# Patient Record
Sex: MALE | Race: WHITE | HISPANIC OR LATINO | Employment: FULL TIME | URBAN - METROPOLITAN AREA
[De-identification: names, ages, dates, MRNs, and addresses within clinical notes are randomized per-mention and may not be internally consistent; named-entity substitution may affect disease eponyms.]

---
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
## 2024-02-05 ENCOUNTER — DOCUMENTATION (OUTPATIENT)
Dept: BEHAVIORAL/MENTAL HEALTH CLINIC | Facility: CLINIC | Age: 19
End: 2024-02-05

## 2024-02-05 DIAGNOSIS — F43.29 ADJUSTMENT DISORDER WITH OTHER SYMPTOM: Primary | ICD-10-CM

## 2024-02-05 NOTE — PROGRESS NOTES
Psychotherapy Discharge Summary    Preferred Name: Malvin Okeefe  YOB: 2005    Admission date to psychotherapy: 06/08/2023    Referred by: Client referred self    Presenting Problem:  therapy related to anger issues     Course of treatment included : individual case management, psychoeducation, and individual therapy     Progress/Outcome of Treatment Goals (brief summary of course of treatment) No progress in treatment. Client attended a total of three sessions with the clinician.    Treatment Complications (if any): none    Treatment Progress: fair    Current [facility] Psychiatric Provider: None    Discharge Medications include: None    Discharge Date: 02/05/2024    Discharge Diagnosis:   1. Adjustment disorder with other symptom            Criteria for Discharge: two or more unexcused absences for services    Aftercare recommendations include (include specific referral names and phone numbers, if appropriate): None    Prognosis: fair

## 2024-02-26 ENCOUNTER — TELEPHONE (OUTPATIENT)
Dept: PSYCHIATRY | Facility: CLINIC | Age: 19
End: 2024-02-26

## 2024-03-08 ENCOUNTER — SOCIAL WORK (OUTPATIENT)
Dept: BEHAVIORAL/MENTAL HEALTH CLINIC | Facility: CLINIC | Age: 19
End: 2024-03-08
Payer: COMMERCIAL

## 2024-03-08 DIAGNOSIS — Z63.8: ICD-10-CM

## 2024-03-08 DIAGNOSIS — F43.29 ADJUSTMENT DISORDER WITH OTHER SYMPTOM: Primary | ICD-10-CM

## 2024-03-08 DIAGNOSIS — Z63.9 DYSFUNCTIONAL FAMILY PROCESSES: ICD-10-CM

## 2024-03-08 PROCEDURE — 90791 PSYCH DIAGNOSTIC EVALUATION: CPT

## 2024-03-08 SDOH — SOCIAL STABILITY - SOCIAL INSECURITY: PROBLEM RELATED TO PRIMARY SUPPORT GROUP, UNSPECIFIED: Z63.9

## 2024-03-08 SDOH — SOCIAL STABILITY - SOCIAL INSECURITY: OTHER SPECIFIED PROBLEMS RELATED TO PRIMARY SUPPORT GROUP: Z63.8

## 2024-03-08 NOTE — PSYCH
Behavioral Health Psychotherapy Assessment    Date of Initial Psychotherapy Assessment: 03/08/24  Referral Source: ***  Has a release of information been signed for the referral source? {Yes/No/NA:52836}    Preferred Name: Malvin Okeefe  Preferred Pronouns: {Preferred Pronouns:84176}  YOB: 2005 Age: 19 y.o.  Sex assigned at birth: {SL Sex Assigned at Birth:7907635163}   Gender Identity: ***  Race: {Race/ethnicity:35652}  Preferred Language: {Misc; languages:84468}    Emergency Contact:  Full Name: ***  Relationship to Client: ***  Contact information: ***    Primary Care Physician:  Mary Dressler-Carre, CRNP  [address] [place] [address]  [phone]  Has a release of information been signed? {Yes/No/NA:89904}    Physical Health History:  Past surgical procedures: ***  Do you have a history of any of the following: {SL Amb Psych Physical Health:00067}  Do you have any mobility issues? {YES-DESCRIBE/NO:43984}    Relevant Family History:  ***    Presenting Problem (What brings you in?)  ***    Mental Health Advance Directive:  Do you currently have a Mental Health Advance Directive?{YES/NO:20200}    Diagnosis:  No diagnosis found.    Initial Assessment:     Substance Abuse/Addiction Assessment:  Alcohol: Yes    Age of First Use:  18  Amount:  2-3 beers a couple times a month  Marijuana: Yes    Age of First Use:  16  Have you experienced blackouts as a result of substance use: Yes      Relationship History:    Relationship History:  In a long distance relationship with a female in [place]    Employment History    Are you currently employed: No      Currently seeking employment: No      Longest period of employment:  2 years     History:      Status: no history of  duty  Educational History:     Highest level of education:  Some college    School attended/attending:  [employer]    Have you ever had an IEP or 504-plan: No      Do you need  assistance with reading or writing: No          Visit start and stop times:    03/08/24  Start Time: 1002  Stop Time: 1052  Total Visit Time: 50 minutes

## 2024-03-11 NOTE — PSYCH
"Behavioral Health Psychotherapy Progress Note    Psychotherapy Provided: Individual Psychotherapy     No diagnosis found.    Goals addressed in session: Goal 1     DATA:   Met with client individually and reviewed intake completed in June 2023.  Client reported that conflcits within the family continue to be main issue as well as long distance relationship with female in [place].  Client reported wanting to work on coping skills for anger.   During this session, this clinician used the following therapeutic modalities: Engagement Strategies, Client-centered Therapy, and Supportive Psychotherapy    Substance Abuse was not addressed during this session. If the client is diagnosed with a co-occurring substance use disorder, please indicate any changes in the frequency or amount of use: . Stage of change for addressing substance use diagnoses: No substance use/Not applicable    ASSESSMENT:  Malvin Okeefe presents with a Anxious mood.     his affect is Blunted, which is congruent, with his mood and the content of the session. The client has not made progress on their goals.     Malvin Okeefe presents with a minimal risk of suicide, minimal risk of self-harm, and minimal risk of harm to others.    For any risk assessment that surpasses a \"low\" rating, a safety plan must be developed.    A safety plan was indicated: no  If yes, describe in detail crisis plan to be completed at next session    PLAN: Between sessions, Malvin Okeefe will try to identify any previous coping skills that helped with anger. At the next session, the therapist will use Engagement Strategies, Client-centered Therapy, and Supportive Psychotherapy to address anger management.    Behavioral Health Treatment Plan and Discharge Planning: Malvin Okeefe is aware of and agrees to continue to work on their treatment plan. They have identified and are working toward their discharge goals. yes    Visit start and stop " times:    03/11/24  Start Time: 1002  Stop Time: 1052  Total Visit Time: 50 minutes

## 2024-03-22 ENCOUNTER — SOCIAL WORK (OUTPATIENT)
Dept: BEHAVIORAL/MENTAL HEALTH CLINIC | Facility: CLINIC | Age: 19
End: 2024-03-22
Payer: COMMERCIAL

## 2024-03-22 DIAGNOSIS — Z63.9 DYSFUNCTIONAL FAMILY PROCESSES: ICD-10-CM

## 2024-03-22 DIAGNOSIS — F43.29 ADJUSTMENT DISORDER WITH OTHER SYMPTOM: Primary | ICD-10-CM

## 2024-03-22 PROCEDURE — 90837 PSYTX W PT 60 MINUTES: CPT

## 2024-03-22 SDOH — SOCIAL STABILITY - SOCIAL INSECURITY: PROBLEM RELATED TO PRIMARY SUPPORT GROUP, UNSPECIFIED: Z63.9

## 2024-03-22 NOTE — BH TREATMENT PLAN
Outpatient Behavioral Health Psychotherapy Treatment Plan    Malvin Okeefe  2005     Date of Initial Psychotherapy Assessment:    Date of Current Treatment Plan: 03/22/24  Treatment Plan Target Date: 9/15/24  Treatment Plan Expiration Date: 9/15/24    Diagnosis:   No diagnosis found.    Area(s) of Need: Anger management,identifying emotions    Long Term Goal 1 (in the client's own words): I want to learn coping skills for when my anger is out of control    Stage of Change: Preparation    Target Date for completion: 9/15/24     Anticipated therapeutic modalities: talk therapy, CBT, mindfulness     People identified to complete this goal: Malvin therapist      Objective 1: (identify the means of measuring success in meeting the objective):   Malvin hall participate in sessions using various modalities to identify triggers of anger as well as increasing his ability to identify other emotions      Objective 2: (identify the means of measuring success in meeting the objective):   Malvin will identify 2-3 coping skills to decrease his anger and will increase his ability to appropriately express her emotions         I am currently under the care of a [facility] psychiatric provider: no    My [facility] psychiatric provider is:     I am currently taking psychiatric medications: No    I feel that I will be ready for discharge from mental health care when I reach the following (measurable goal/objective): 75%    For children and adults who have a legal guardian:   Has there been any change to custody orders and/or guardianship status? NA. If yes, attach updated documentation.    I have created my Crisis Plan and have been offered a copy of this plan    Behavioral Health Treatment Plan [facility]: Diagnosis and Treatment Plan explained to Malvin Okeefe acknowledges an understanding of their diagnosis. Malvin Okeefe agrees to this treatment plan.    I have been offered a copy of this  Treatment Plan. yes
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
## 2024-04-05 ENCOUNTER — SOCIAL WORK (OUTPATIENT)
Dept: BEHAVIORAL/MENTAL HEALTH CLINIC | Facility: CLINIC | Age: 19
End: 2024-04-05
Payer: COMMERCIAL

## 2024-04-05 DIAGNOSIS — Z63.9 DYSFUNCTIONAL FAMILY PROCESSES: ICD-10-CM

## 2024-04-05 DIAGNOSIS — F43.29 ADJUSTMENT DISORDER WITH OTHER SYMPTOM: Primary | ICD-10-CM

## 2024-04-05 PROCEDURE — 90834 PSYTX W PT 45 MINUTES: CPT

## 2024-04-05 SDOH — SOCIAL STABILITY - SOCIAL INSECURITY: PROBLEM RELATED TO PRIMARY SUPPORT GROUP, UNSPECIFIED: Z63.9
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
## 2024-04-26 ENCOUNTER — SOCIAL WORK (OUTPATIENT)
Dept: BEHAVIORAL/MENTAL HEALTH CLINIC | Facility: CLINIC | Age: 19
End: 2024-04-26

## 2024-04-26 DIAGNOSIS — Z63.9 DYSFUNCTIONAL FAMILY PROCESSES: ICD-10-CM

## 2024-04-26 DIAGNOSIS — Z63.8: ICD-10-CM

## 2024-04-26 DIAGNOSIS — F43.29 ADJUSTMENT DISORDER WITH OTHER SYMPTOM: Primary | ICD-10-CM

## 2024-04-26 SDOH — SOCIAL STABILITY - SOCIAL INSECURITY: OTHER SPECIFIED PROBLEMS RELATED TO PRIMARY SUPPORT GROUP: Z63.8

## 2024-04-26 SDOH — SOCIAL STABILITY - SOCIAL INSECURITY: PROBLEM RELATED TO PRIMARY SUPPORT GROUP, UNSPECIFIED: Z63.9

## 2024-04-26 NOTE — PSYCH
No Call. No Show. No Charge    Malvin Okeefe no showed 04/26/24 appointment , staff sent email to reschedule    Treatment Plan not due at this session.

## 2024-05-10 ENCOUNTER — SOCIAL WORK (OUTPATIENT)
Dept: BEHAVIORAL/MENTAL HEALTH CLINIC | Facility: CLINIC | Age: 19
End: 2024-05-10
Payer: COMMERCIAL

## 2024-05-10 DIAGNOSIS — F43.29 ADJUSTMENT DISORDER WITH OTHER SYMPTOM: Primary | ICD-10-CM

## 2024-05-10 DIAGNOSIS — Z63.9 DYSFUNCTIONAL FAMILY PROCESSES: ICD-10-CM

## 2024-05-10 DIAGNOSIS — Z63.8: ICD-10-CM

## 2024-05-10 PROCEDURE — 90837 PSYTX W PT 60 MINUTES: CPT

## 2024-05-10 SDOH — SOCIAL STABILITY - SOCIAL INSECURITY: PROBLEM RELATED TO PRIMARY SUPPORT GROUP, UNSPECIFIED: Z63.9

## 2024-05-10 SDOH — SOCIAL STABILITY - SOCIAL INSECURITY: OTHER SPECIFIED PROBLEMS RELATED TO PRIMARY SUPPORT GROUP: Z63.8

## 2024-05-16 ENCOUNTER — TELEPHONE (OUTPATIENT)
Dept: PSYCHIATRY | Facility: CLINIC | Age: 19
End: 2024-05-16

## 2024-05-16 NOTE — TELEPHONE ENCOUNTER
Spoke to patient and/or parent/guardian to make aware of the Psych Encounter form needing to be signed from recent completed appointment(s).     Patient also stated he would create a MyChart to electronically sign the form.

## 2024-08-02 ENCOUNTER — SOCIAL WORK (OUTPATIENT)
Dept: BEHAVIORAL/MENTAL HEALTH CLINIC | Facility: CLINIC | Age: 19
End: 2024-08-02
Payer: COMMERCIAL

## 2024-08-02 DIAGNOSIS — F43.29 ADJUSTMENT DISORDER WITH OTHER SYMPTOM: Primary | ICD-10-CM

## 2024-08-02 DIAGNOSIS — Z63.9 DYSFUNCTIONAL FAMILY PROCESSES: ICD-10-CM

## 2024-08-02 DIAGNOSIS — Z63.8: ICD-10-CM

## 2024-08-02 PROCEDURE — 90834 PSYTX W PT 45 MINUTES: CPT

## 2024-08-02 SDOH — SOCIAL STABILITY - SOCIAL INSECURITY: PROBLEM RELATED TO PRIMARY SUPPORT GROUP, UNSPECIFIED: Z63.9

## 2024-08-02 SDOH — SOCIAL STABILITY - SOCIAL INSECURITY: OTHER SPECIFIED PROBLEMS RELATED TO PRIMARY SUPPORT GROUP: Z63.8

## 2024-08-02 NOTE — PSYCH
"Behavioral Health Psychotherapy Progress Note    Psychotherapy Provided: Individual Psychotherapy     No diagnosis found.    Goals addressed in session: Goal 1     DATA:  trip to [place] to see Magalie.  Angry=left for a break, she thought he left her at the hotel.  Talk about needing time out.  Magalies parents and area of [facility].  Father just built a house there, will be going back and forth.  Triggers vs slow boil.  Coping skills=breathing, self talk.  Reason=event/environment or genetics.  School plan for Associates in Business then BA at [employer] or [employer]=goal to open own company eventually.   During this session, this clinician used the following therapeutic modalities: Client-centered Therapy, Cognitive Behavioral Therapy, Motivational Interviewing, and Supportive Psychotherapy    Substance Abuse was not addressed during this session. If the client is diagnosed with a co-occurring substance use disorder, please indicate any changes in the frequency or amount of use: . Stage of change for addressing substance use diagnoses: No substance use/Not applicable    ASSESSMENT:  Malvin Okeefe presents with a Anxious mood.     his affect is Blunted, which is congruent, with his mood and the content of the session. The client has not made progress on their goals.     Malvin Okeefe presents with a minimal risk of suicide, minimal risk of self-harm, and minimal risk of harm to others.    For any risk assessment that surpasses a \"low\" rating, a safety plan must be developed.    A safety plan was indicated: no  If yes, describe in detail crisis plan on file    PLAN: Between sessions, Malvin Okeefe will use coping skill of take a \"time out\" when angry and will consider where anger is stemming from. At the next session, the therapist will use Client-centered Therapy, Cognitive Behavioral Therapy, and Supportive Psychotherapy to address anger management.    Behavioral Health Treatment Plan and " Discharge Planning: Malvin ALPA Audrey Okeefe is aware of and agrees to continue to work on their treatment plan. They have identified and are working toward their discharge goals. yes    Visit start and stop times:    08/02/24  Start Time: 1003  Stop Time: 1052  Total Visit Time: 49 minutes

## 2024-08-30 ENCOUNTER — SOCIAL WORK (OUTPATIENT)
Dept: BEHAVIORAL/MENTAL HEALTH CLINIC | Facility: CLINIC | Age: 19
End: 2024-08-30
Payer: COMMERCIAL

## 2024-08-30 DIAGNOSIS — F43.29 ADJUSTMENT DISORDER WITH OTHER SYMPTOM: Primary | ICD-10-CM

## 2024-08-30 DIAGNOSIS — Z63.8: ICD-10-CM

## 2024-08-30 DIAGNOSIS — Z63.9 DYSFUNCTIONAL FAMILY PROCESSES: ICD-10-CM

## 2024-08-30 PROCEDURE — 90837 PSYTX W PT 60 MINUTES: CPT

## 2024-08-30 SDOH — SOCIAL STABILITY - SOCIAL INSECURITY: OTHER SPECIFIED PROBLEMS RELATED TO PRIMARY SUPPORT GROUP: Z63.8

## 2024-08-30 SDOH — SOCIAL STABILITY - SOCIAL INSECURITY: PROBLEM RELATED TO PRIMARY SUPPORT GROUP, UNSPECIFIED: Z63.9

## 2024-08-30 NOTE — PSYCH
"Behavioral Health Psychotherapy Progress Note    Psychotherapy Provided: Individual Psychotherapy     No diagnosis found.    Goals addressed in session: Goal 1     DATA:   college courses-5.  GF argument, client \"flipped out\" on her, she has not returned calls or texts since Monday and turned off location.  Anxiety attacks-\"need to do something\", tingling in extremities, rapid heartbeat, sick feeling in stomach.  Ways to stop anxiety=keep busy (go to the beach with family this weekend), self talk, rubber band.  Client has been \"spamming\" her since Monday-she may need space.  Client agreed to contact 1-2 times a day max and leave a very short message \"I'm here, I want to work this out, call me when you are ready\".  Root of anger-possibly insecurity?    During this session, this clinician used the following therapeutic modalities: Client-centered Therapy, Cognitive Behavioral Therapy, Solution-Focused Therapy, and Supportive Psychotherapy    Substance Abuse was addressed during this session. If the client is diagnosed with a co-occurring substance use disorder, please indicate any changes in the frequency or amount of use:  client aware that if he starts drinking, he will not stop and that has negative outcomes for him. Stage of change for addressing substance use diagnoses: Contemplation    ASSESSMENT:  Malvin Okeefe presents with a Depressed mood.     his affect is Blunted, which is congruent, with his mood and the content of the session. The client has made progress on their goals.  Client is beginning to express feelings rather than pushing them down and denying.      Malvin Okeefe presents with a minimal risk of suicide, minimal risk of self-harm, and minimal risk of harm to others.    For any risk assessment that surpasses a \"low\" rating, a safety plan must be developed.    A safety plan was indicated: no  If yes, describe in detail crisis plan on file    PLAN: Between sessions, Malvin Ventura" Maldonado will continue to express feelings appropriately and will use self talk and rubber band to curb impulses. At the next session, the therapist will use Client-centered Therapy, Cognitive Behavioral Therapy, Solution-Focused Therapy, and Supportive Psychotherapy to address anxiety and anger.    Behavioral Health Treatment Plan and Discharge Planning: Malvin Okeefe is aware of and agrees to continue to work on their treatment plan. They have identified and are working toward their discharge goals. yes    Visit start and stop times:    08/30/24  1002  8788  56 minutes

## 2024-09-06 ENCOUNTER — TELEPHONE (OUTPATIENT)
Dept: PSYCHIATRY | Facility: CLINIC | Age: 19
End: 2024-09-06

## 2024-09-06 NOTE — TELEPHONE ENCOUNTER
Left voicemail informing patient and/or parent/guardian of the Psych Encounter form needing to be signed as a requirement from the insurance company for billing purposes. Patient can access form via [employer] and sign electronically.     Please make patient aware this form must be signed for each visit as a requirement to continue future visits with provider.

## 2024-09-10 ENCOUNTER — SOCIAL WORK (OUTPATIENT)
Dept: BEHAVIORAL/MENTAL HEALTH CLINIC | Facility: CLINIC | Age: 19
End: 2024-09-10
Payer: COMMERCIAL

## 2024-09-10 DIAGNOSIS — Z63.9 DYSFUNCTIONAL FAMILY PROCESSES: ICD-10-CM

## 2024-09-10 DIAGNOSIS — Z63.8: ICD-10-CM

## 2024-09-10 DIAGNOSIS — F43.29 ADJUSTMENT DISORDER WITH OTHER SYMPTOM: Primary | ICD-10-CM

## 2024-09-10 PROCEDURE — 90834 PSYTX W PT 45 MINUTES: CPT

## 2024-09-10 SDOH — SOCIAL STABILITY - SOCIAL INSECURITY: PROBLEM RELATED TO PRIMARY SUPPORT GROUP, UNSPECIFIED: Z63.9

## 2024-09-10 SDOH — SOCIAL STABILITY - SOCIAL INSECURITY: OTHER SPECIFIED PROBLEMS RELATED TO PRIMARY SUPPORT GROUP: Z63.8
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
## 2024-09-25 ENCOUNTER — TELEPHONE (OUTPATIENT)
Dept: PSYCHIATRY | Facility: CLINIC | Age: 19
End: 2024-09-25

## 2024-09-25 NOTE — TELEPHONE ENCOUNTER
Patient is calling regarding cancelling an appointment.    Date/Time: 9/26/24 @ 10 am     Reason: can't make it in .    Patient was rescheduled: YES [] NO [x]

## 2024-10-11 ENCOUNTER — SOCIAL WORK (OUTPATIENT)
Dept: BEHAVIORAL/MENTAL HEALTH CLINIC | Facility: CLINIC | Age: 19
End: 2024-10-11
Payer: COMMERCIAL

## 2024-10-11 DIAGNOSIS — F43.29 ADJUSTMENT DISORDER WITH OTHER SYMPTOM: Primary | ICD-10-CM

## 2024-10-11 DIAGNOSIS — Z63.9 DYSFUNCTIONAL FAMILY PROCESSES: ICD-10-CM

## 2024-10-11 PROCEDURE — 90834 PSYTX W PT 45 MINUTES: CPT

## 2024-10-11 SDOH — SOCIAL STABILITY - SOCIAL INSECURITY: PROBLEM RELATED TO PRIMARY SUPPORT GROUP, UNSPECIFIED: Z63.9

## 2024-10-11 NOTE — PSYCH
"Behavioral Health Psychotherapy Progress Note    Psychotherapy Provided: Individual Psychotherapy     No diagnosis found.    Goals addressed in session: Goal 1     DATA:   Met with client individually.  Client discussed pressures of keeping up with both work and school.  Math class is challenging and stressful.  Client feels pressure from parents.  He stated that they are immigrants and came here so that their children could have a better life and he feels that he is responsible for making that happen.  Client stated that he and his mother had a recent conflict over his grades but he stated that he was able to relax and calm.  Client stated that he used breathing and thinking about the outcome if he got angry.  Therapist reviewed the concept of  \"Playing the tape\".  Client stated that he wants to focus on his long term goals and looking at the big picture.  Therapist reviewed options for ongoing therapy after her long-term and client has chosen to transfer to another therapist. Discussed process.   .   During this session, this clinician used the following therapeutic modalities: Client-centered Therapy, Cognitive Behavioral Therapy, and Supportive Psychotherapy    Substance Abuse was not addressed during this session. If the client is diagnosed with a co-occurring substance use disorder, please indicate any changes in the frequency or amount of use: . Stage of change for addressing substance use diagnoses: No substance use/Not applicable    ASSESSMENT:  Malvin Okeefe presents with a Euthymic/ normal mood.     his affect is Normal range and intensity, which is congruent, with his mood and the content of the session. The client has made progress on their goals. Malvin has improved his ability to stop andthink about outcomes of an angry outburst but continues to struggle with expression of his feelings.      Malvin Okeefe presents with a minimal risk of suicide, minimal risk of self-harm, and minimal " "risk of harm to others.    For any risk assessment that surpasses a \"low\" rating, a safety plan must be developed.    A safety plan was indicated: no  If yes, describe in detail crisis plan on file    PLAN: Between sessions, Malvin Okeefe will continue to use coping skills when angry. At the next session, the therapist will use Client-centered Therapy, Cognitive Behavioral Therapy, and Supportive Psychotherapy to address anger management.    Behavioral Health Treatment Plan and Discharge Planning: Malvin Okeefe is aware of and agrees to continue to work on their treatment plan. They have identified and are working toward their discharge goals. yes    Visit start and stop times:    10/11/24  Start Time: 1006  Stop Time: 1057  Total Visit Time: 51 minutes  "

## 2024-10-14 ENCOUNTER — DOCUMENTATION (OUTPATIENT)
Dept: BEHAVIORAL/MENTAL HEALTH CLINIC | Facility: CLINIC | Age: 19
End: 2024-10-14

## 2024-10-14 NOTE — PROGRESS NOTES
TRANSFER SUMMARY    [facility]    Patient Name Malvin Okeefe     Date of Birth: 19 y.o. 2005      MRN: [id number]    Admission Date:  3/8/24    Date of Transfer: October 14, 2024    Admission Diagnosis:     Adjustment disorder with other symptoms   Dysfunctional family process    Current Diagnosis:     No diagnosis found.    Reason for Admission: Malvin presented for treatment due to depressive symptoms and anger . Primary complaints included DEPRESSIVE SYMPTOMS: depressed mood, low energy, irritability and MOOD INSTABILITY SYMPTOMS: irritability, erratic behavior, anger outbursts.     Progress in Treatment: Malvin was seen for Individual Couseling. During the course of treatment he participated in sessions to identify the triggers for his anger and depressive symptoms and to develop coping skills.    Episodes of Higher Level of Care: No    Transfer request Initiated by: Psychiatrist:  Therapist: Ying Zamora    Reason for Transfer Request: clinician leaving practice    Does this individual need a clinician with specialized training/expertise?: No    Is this client working with any other [facility]Therapists? Psychiatrist:  Therapist:     Other pertinent issues: None    Are there any specific individuals who would be a “best fit” or who have already agreed to accept this transfer request?      Psychiatrist:    Therapist:  Ray Givens  Rationale: Better fit    Attempts to maintain the current therapeutic relationship: Not Applicable    Transfer request routed to Clinical Supervisor for input and/or approval.         Kang Zamora10/14/24

## 2024-10-25 ENCOUNTER — SOCIAL WORK (OUTPATIENT)
Dept: BEHAVIORAL/MENTAL HEALTH CLINIC | Facility: CLINIC | Age: 19
End: 2024-10-25
Payer: COMMERCIAL

## 2024-10-25 ENCOUNTER — DOCUMENTATION (OUTPATIENT)
Dept: BEHAVIORAL/MENTAL HEALTH CLINIC | Facility: CLINIC | Age: 19
End: 2024-10-25

## 2024-10-25 DIAGNOSIS — F43.29 ADJUSTMENT DISORDER WITH OTHER SYMPTOM: Primary | ICD-10-CM

## 2024-10-25 DIAGNOSIS — Z63.9 DYSFUNCTIONAL FAMILY PROCESSES: ICD-10-CM

## 2024-10-25 PROCEDURE — 90834 PSYTX W PT 45 MINUTES: CPT

## 2024-10-25 SDOH — SOCIAL STABILITY - SOCIAL INSECURITY: PROBLEM RELATED TO PRIMARY SUPPORT GROUP, UNSPECIFIED: Z63.9
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
## 2024-10-25 NOTE — PROGRESS NOTES
TRANSFER SUMMARY    [facility]    Patient Name Malvin Okeefe     Date of Birth: 19 y.o. 2005      MRN: [id number]    Admission Date:  3/8/2024    Date of Transfer: October 25, 2024    Admission Diagnosis:     Adjustment Disorder with mixed emotions  Dysfunctional family processes    Current Diagnosis:     No diagnosis found.    Reason for Admission: Malvin presented for treatment due to mood instability and anger . Primary complaints included MOOD INSTABILITY SYMPTOMS: irritability, agitation, aggressive behavior.     Progress in Treatment: Malvin was seen for Individual Couseling. During the course of treatment he has progressed in his ability to express feelings related to being discriminated against in his community for being .  His family has been the subject of discrimination as well-his father was detained by ICE several years ago.  .    Episodes of Higher Level of Care: No    Transfer request Initiated by: Psychiatrist:  Therapist:  Kang Zamora    Reason for Transfer Request: clinician leaving practice    Does this individual need a clinician with specialized training/expertise?: No    Is this client working with any other [facility] Providers/Therapists? Psychiatrist: None Therapist: None    Other pertinent issues:  Substance use issues - Malvin has identified drinking as a factor in his anger coming out and has chosed to stop drinking as of 3 weeks ago.  He has also admitted to smoking marijuana on a regular basis to help with sleep but understands that it may have negative consequences as well    Are there any specific individuals who would be a “best fit” or who have already agreed to accept this transfer request?  Prefers a female therapist    Psychiatrist:    Therapist: Chen Fitzgerald or Bernice Moulton or Bri Kee  Rationale: Patient prefers female clinician    Attempts to maintain the current therapeutic relationship: Not  Applicable    Transfer request routed to Clinical Supervisor for input and/or approval.         Kang Zamora10/25/24

## 2024-10-25 NOTE — BH TREATMENT PLAN
Outpatient Behavioral Health Psychotherapy Treatment Plan    Malvin Okeefe  2005   Date of Initial Psychotherapy Assessment:    Date of Current Treatment Plan:10/25/24  Treatment Plan Target Date: 4/15/25  Treatment Plan Expiration Date: 4/15/25     Diagnosis:   No diagnosis found.     Area(s) of Need: Anger management,identifying emotions     Long Term Goal 1 (in the client's own words): I want to learn coping skills for when my anger is out of control     Stage of Change: Preparation     Target Date for completion: 4/15/25             Anticipated therapeutic modalities: talk therapy, CBT, mindfulness             People identified to complete this goal: Malvin therapist                    Objective 1: (identify the means of measuring success in meeting the objective):   Malvin hall participate in sessions using various modalities to identify triggers of anger as well as increasing his ability to identify other emotions                    Objective 2: (identify the means of measuring success in meeting the objective):   Malvin will identify 2-3 coping skills to decrease his anger and will increase his ability to appropriately express her emotions         I am currently under the care of a [facility] psychiatric provider: no     My [facility] psychiatric provider is:      I am currently taking psychiatric medications: No     I feel that I will be ready for discharge from mental health care when I reach the following (measurable goal/objective): when I can use my coping strategies effectively 80% of the time to reduce anger and express my feelings appropriatley     For children and adults who have a legal guardian:          Has there been any change to custody orders and/or guardianship status? NA. If yes, attach updated documentation.     I have created my Crisis Plan and have been offered a copy of this plan     Behavioral Health Treatment Plan  Luke: Diagnosis and Treatment Plan explained to Malvin YUEN  Audrey Okeefe acknowledges an understanding of their diagnosis. Malvin Okeefe agrees to this treatment plan.     I have been offered a copy of this Treatment Plan. yes

## 2024-11-01 PROBLEM — F43.21 ADJUSTMENT DISORDER WITH DEPRESSED MOOD: Status: ACTIVE | Noted: 2024-11-01

## 2025-03-07 ENCOUNTER — TELEPHONE (OUTPATIENT)
Age: 20
End: 2025-03-07

## 2025-03-07 NOTE — TELEPHONE ENCOUNTER
Malvin Okeefe called and  requested a call back to discuss rescheduling his missed ANISH appt. Patient requested a female therapist if possible. .    They can be reached at P# [phone].       Thank you.

## 2025-03-10 ENCOUNTER — OFFICE VISIT (OUTPATIENT)
Dept: BEHAVIORAL/MENTAL HEALTH CLINIC | Facility: CLINIC | Age: 20
End: 2025-03-10
Payer: COMMERCIAL

## 2025-03-10 DIAGNOSIS — F43.21 ADJUSTMENT DISORDER WITH DEPRESSED MOOD: Primary | ICD-10-CM

## 2025-03-10 PROCEDURE — 90837 PSYTX W PT 60 MINUTES: CPT

## 2025-03-10 NOTE — PSYCH
Behavioral Health Psychotherapy Progress Note    Psychotherapy Provided: Individual Psychotherapy     1. Adjustment disorder with depressed mood          Transfer Assessment:    What initially led you to seek treatment: anger management    When do you feel that your symptoms began: in high school    What goals were you working on with your previous therapist: increasing awareness of triggers for anger    Where do you feel that you currently are with meeting those goals: being aware of triggers but still needing better coping stressors    What do you currently want to get out of therapy: coping strategies for anger    What works well for you in therapy: being asked questions    What does not work well for you in therapy: did not discuss    Is there anything important about you that you think is important for me to know: no       Goals addressed in session: Goal 1     DATA: Malvin met with therapist for scheduled individual therapy session. This was initial transfer of care session, Malvin and therapist worked on building rapport. Malvin discussed his relationships with his family and how they impact his life. 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 Malvin discussed struggling with his anger and wanting to work on building coping strategies. The therapist engaged in active listening throughout the session and reflected Malvin's thoughts and feelings.  During this session, this clinician used the following therapeutic modalities: Client-centered Therapy and Supportive Psychotherapy    Substance Abuse was not addressed during this session. If the client is diagnosed with a co-occurring substance use disorder, please indicate any changes in the frequency or amount of use: n/a. Stage of change for addressing substance use diagnoses: No substance use/Not applicable    ASSESSMENT:  Malvin Okeefe presents with a Euthymic/ normal mood.     his affect is Normal range and intensity, which is  "congruent, with his mood and the content of the session. The client has made progress on their goals.     Malvin Okeefe presents with a none risk of suicide, none risk of self-harm, and none risk of harm to others.    For any risk assessment that surpasses a \"low\" rating, a safety plan must be developed.    A safety plan was indicated: no  If yes, describe in detail n/a    PLAN: Between sessions, Malvin Okeefe will increase awareness of therapeutic goals. At the next session, the therapist will use Client-centered Therapy and Supportive Psychotherapy to address managing triggers for anger.    Behavioral Health Treatment Plan and Discharge Planning: Malvin Okeefe is aware of and agrees to continue to work on their treatment plan. They have identified and are working toward their discharge goals. yes    Depression Follow-up Plan Completed: Not applicable    This note was not shared with the patient due to this is a psychotherapy note    Visit start and stop times:  03/10/25  Start Time: 0802  Stop Time: 0857  Total Visit Time: 55 minutes  "

## 2025-03-24 ENCOUNTER — SOCIAL WORK (OUTPATIENT)
Dept: BEHAVIORAL/MENTAL HEALTH CLINIC | Facility: CLINIC | Age: 20
End: 2025-03-24
Payer: COMMERCIAL

## 2025-03-24 DIAGNOSIS — F43.21 ADJUSTMENT DISORDER WITH DEPRESSED MOOD: Primary | ICD-10-CM

## 2025-03-24 PROCEDURE — 90834 PSYTX W PT 45 MINUTES: CPT

## 2025-03-24 NOTE — BH CRISIS PLAN
Client Name: Malvin Okeefe       Client YOB: 2005    [place]jam[place] Safety Plan      Creation Date: 3/22/24 Update Date: 3/23/26   Created By: Kang Zamora Last Updated By: Karla Moulton      Step 1: Warning Signs:   Warning Signs   feeling more angry   increased cursing   heavy breathing            Step 2: Internal Coping Strategies:   Internal Coping Strategies   distract with dog or X box            Step 3: People and social settings that provide distraction:   Name Contact Information   Eloy in cell phone   Magalie in cell phone    Places   going to the mall           Step 4: People whom I can ask for help during a crisis:      Name Contact Information    parents in cell phone    Boby in cell phone      Step 5: Professionals or agencies I can contact during a crisis:      Clinican/Agency Name Phone Emergency Contact    [facility] [phone]       [facility] Emergency Department Emergency Department Phone Emergency Department Address    [facility] [phone]         Crisis Phone Numbers:   Suicide Prevention Lifeline: Call or Text  986 Crisis Text Line: Text HOME to 960-611   Please note: Some [facility] do not have a separate number for Child/Adolescent specific crisis. If your county is not listed under Child/Adolescent, please call the adult number for your county      Adult Crisis Numbers: Child/Adolescent Crisis Numbers   [facility]: [phone] [facility]: [phone]   [facility]: [phone] [facility]: [phone]   [facility]: [phone] [place]: [phone]   [facility]: [phone] [place]: [phone]   [place] [facility]: [phone]   [facility]: [phone]   [facility]: [phone]   [facility]: [phone] (daytime) [phone] (after hours, weekends, holidays)      Step 6: Making the environment safer (plan for lethal means safety):   Patient did not  identify any lethal methods: Yes     Optional: What is most important to me and worth living for?      [place] Safety Plan. Robyn Butts and Jayjay Angel. Used with permission of the authors.

## 2025-03-24 NOTE — BH TREATMENT PLAN
Outpatient Behavioral Health Psychotherapy Treatment Plan    Malvin Okeefe  2005     Date of Initial Psychotherapy Assessment: unknown   Date of Current Treatment Plan: 03/24/25  Treatment Plan Target Date: 9/23/25  Treatment Plan Expiration Date: 9/23/25    Diagnosis:   1. Adjustment disorder with depressed mood          Area(s) of Need: coping strategies    Long Term Goal 1 (in the client's own words): I want to manage anger symptoms differently  Stage of Change: Preparation  Target Date for completion: 9/23/25   Anticipated therapeutic modalities: client-centered therapy, cognitive processing therapy, cognitive behavioral therapy, solution focused therapy   People identified to complete this goal: client, therapist   Objective 1: (identify the means of measuring success in meeting the objective): Increase awareness of triggers for anger   Objective 2: (identify the means of measuring success in meeting the objective): Increase awareness of alternative coping strategies         I am currently under the care of a [facility] psychiatric provider: no    My [facility] psychiatric provider is: n/a    I am currently taking psychiatric medications: No    I feel that I will be ready for discharge from mental health care when I reach the following (measurable goal/objective): when I can manage my mental health symptoms independently    For children and adults who have a legal guardian:   Has there been any change to custody orders and/or guardianship status? NA. If yes, attach updated documentation.    I have updated my Crisis Plan and have been offered a copy of this plan    Behavioral Health Treatment Plan  Luke: Diagnosis and Treatment Plan explained to Malvin Okeefe acknowledges an understanding of their diagnosis. Malvin Okeefe agrees to this treatment plan.    I have been offered a copy of this Treatment Plan. yes

## 2025-03-24 NOTE — PSYCH
"Behavioral Health Psychotherapy Progress Note    Psychotherapy Provided: Individual Psychotherapy     1. Adjustment disorder with depressed mood            Goals addressed in session: Goal 1     DATA: Malvin met with therapist for scheduled individual therapy session. Malvin discussed mental health symptoms throughout the last few weeks. Malvin discussed triggers for anger and helpful coping strategies. Malvin discussed his relationships with his family. Malvin processed thoughts and feelings surrounding having different perspectives and opinions than his family members. Malvin shared that his grandmother in [place] is sick and his family is traveling to go support her. Malvin discussed his desire to go to [place] as well but not being able to go at this time. Malvin shared he is attending a Jewish retreat this weekend with his cousin but has been feeling that he no longer wants to attend. The therapist supported Malvin in identifying coping strategies for the weekend. Malvin and the therapist collaborated to update treatment plan and safety plan.  During this session, this clinician used the following therapeutic modalities: Client-centered Therapy, Cognitive Processing Therapy, and Supportive Psychotherapy    Substance Abuse was not addressed during this session. If the client is diagnosed with a co-occurring substance use disorder, please indicate any changes in the frequency or amount of use: n/a. Stage of change for addressing substance use diagnoses: No substance use/Not applicable    ASSESSMENT:  Malvin Okeefe presents with a Euthymic/ normal mood.     his affect is Normal range and intensity, which is congruent, with his mood and the content of the session. The client has made progress on their goals.     Malvin Okeefe presents with a minimal risk of suicide, minimal risk of self-harm, and none risk of harm to others.    For any risk assessment that surpasses a \"low\" rating, a safety plan must be " developed.    A safety plan was indicated: no  If yes, describe in detail n/a    PLAN: Between sessions, Malvin Okeefe will increase awareness of triggers for anger. At the next session, the therapist will use Client-centered Therapy, Cognitive Processing Therapy, and Supportive Psychotherapy to address increasing awareness of triggers and coping strategies.    Behavioral Health Treatment Plan and Discharge Planning: Malvin Okeefe is aware of and agrees to continue to work on their treatment plan. They have identified and are working toward their discharge goals. yes    Depression Follow-up Plan Completed: Not applicable    This note was not shared with the patient due to this is a psychotherapy note    Visit start and stop times:  03/24/25  Start Time: 0806  Stop Time: 0850  Total Visit Time: 44 minutes

## 2025-05-29 ENCOUNTER — DOCUMENTATION (OUTPATIENT)
Dept: BEHAVIORAL/MENTAL HEALTH CLINIC | Facility: CLINIC | Age: 20
End: 2025-05-29

## 2025-05-29 DIAGNOSIS — F43.21 ADJUSTMENT DISORDER WITH DEPRESSED MOOD: Primary | ICD-10-CM

## 2025-05-29 NOTE — PROGRESS NOTES
Psychotherapy Discharge Summary    Preferred Name: Malvin Okeefe  YOB: 2005    Admission date to psychotherapy: 6/8/2023 (began with this therapist 3/10/2025)    Referred by: self    Presenting Problem: difficulty within family relationships, anxiety symptoms, depression symptoms    Course of treatment included : individual therapy     Progress/Outcome of Treatment Goals (brief summary of course of treatment) Malvin met with multiple therapists at [facility] due to staffing changes. Malvin met with this therapist twice and discussed difficulty within family relationships and worked to identify effective coping strategies.    Treatment Complications (if any): none    Treatment Progress: fair    Current [facility] Psychiatric Provider: none    Discharge Medications include: n/a    Discharge Date: 5/29/25     Discharge Diagnosis:   1. Adjustment disorder with depressed mood            Criteria for Discharge: two or more unexcused absences for services    Patient is cleared to return to [facility] for continued treatment.    Rationale: no challenges with therapeutic rapport    Aftercare recommendations include (include specific referral names and phone numbers, if appropriate): reconnect with therapy in the future if desired    Prognosis: fair